# Patient Record
Sex: MALE | Race: WHITE | URBAN - METROPOLITAN AREA
[De-identification: names, ages, dates, MRNs, and addresses within clinical notes are randomized per-mention and may not be internally consistent; named-entity substitution may affect disease eponyms.]

---

## 2023-03-31 PROBLEM — F39 MOOD DISORDER (HCC): Status: ACTIVE | Noted: 2023-03-31

## 2023-12-12 ENCOUNTER — OFFICE VISIT (OUTPATIENT)
Age: 28
End: 2023-12-12
Payer: COMMERCIAL

## 2023-12-12 VITALS
RESPIRATION RATE: 16 BRPM | DIASTOLIC BLOOD PRESSURE: 78 MMHG | TEMPERATURE: 98.9 F | HEIGHT: 64 IN | BODY MASS INDEX: 38.28 KG/M2 | OXYGEN SATURATION: 99 % | HEART RATE: 73 BPM | SYSTOLIC BLOOD PRESSURE: 122 MMHG

## 2023-12-12 DIAGNOSIS — E03.4 HYPOTHYROIDISM DUE TO ACQUIRED ATROPHY OF THYROID: ICD-10-CM

## 2023-12-12 DIAGNOSIS — R41.82 ACUTE ALTERATION IN MENTAL STATUS: ICD-10-CM

## 2023-12-12 DIAGNOSIS — R27.0 ATAXIA: ICD-10-CM

## 2023-12-12 DIAGNOSIS — H81.90 VERTIGINOUS SYNDROME: ICD-10-CM

## 2023-12-12 DIAGNOSIS — E55.9 VITAMIN D DEFICIENCY: ICD-10-CM

## 2023-12-12 DIAGNOSIS — I65.23 BILATERAL CAROTID ARTERY STENOSIS: ICD-10-CM

## 2023-12-12 DIAGNOSIS — R42 DIZZINESSES: ICD-10-CM

## 2023-12-12 DIAGNOSIS — H83.2X2 VESTIBULAR DISEQUILIBRIUM INVOLVING LEFT INNER EAR: ICD-10-CM

## 2023-12-12 DIAGNOSIS — F84.5 ASPERGER SYNDROME: Primary | ICD-10-CM

## 2023-12-12 DIAGNOSIS — Z51.81 THERAPEUTIC DRUG MONITORING: ICD-10-CM

## 2023-12-12 DIAGNOSIS — F39 MOOD DISORDER (HCC): ICD-10-CM

## 2023-12-12 DIAGNOSIS — E53.8 B12 DEFICIENCY: ICD-10-CM

## 2023-12-12 DIAGNOSIS — R56.9 CONVULSIONS, UNSPECIFIED CONVULSION TYPE (HCC): ICD-10-CM

## 2023-12-12 PROCEDURE — 99205 OFFICE O/P NEW HI 60 MIN: CPT | Performed by: PSYCHIATRY & NEUROLOGY

## 2023-12-12 PROCEDURE — G8427 DOCREV CUR MEDS BY ELIG CLIN: HCPCS | Performed by: PSYCHIATRY & NEUROLOGY

## 2023-12-12 PROCEDURE — G8417 CALC BMI ABV UP PARAM F/U: HCPCS | Performed by: PSYCHIATRY & NEUROLOGY

## 2023-12-12 PROCEDURE — G8484 FLU IMMUNIZE NO ADMIN: HCPCS | Performed by: PSYCHIATRY & NEUROLOGY

## 2023-12-12 RX ORDER — NORTRIPTYLINE HYDROCHLORIDE 25 MG/1
CAPSULE ORAL
COMMUNITY
Start: 2023-12-07 | End: 2023-12-12 | Stop reason: CLARIF

## 2023-12-12 RX ORDER — NORTRIPTYLINE HYDROCHLORIDE 25 MG/1
25 CAPSULE ORAL NIGHTLY
Qty: 30 CAPSULE | Refills: 3
Start: 2023-12-12

## 2023-12-12 RX ORDER — ACETAMINOPHEN 160 MG
TABLET,DISINTEGRATING ORAL
COMMUNITY

## 2023-12-12 RX ORDER — VITAMIN B COMPLEX
1 CAPSULE ORAL DAILY
COMMUNITY

## 2023-12-12 ASSESSMENT — PATIENT HEALTH QUESTIONNAIRE - PHQ9
SUM OF ALL RESPONSES TO PHQ QUESTIONS 1-9: 2
2. FEELING DOWN, DEPRESSED OR HOPELESS: 1
1. LITTLE INTEREST OR PLEASURE IN DOING THINGS: 1
SUM OF ALL RESPONSES TO PHQ9 QUESTIONS 1 & 2: 2
SUM OF ALL RESPONSES TO PHQ QUESTIONS 1-9: 2

## 2023-12-13 ENCOUNTER — CLINICAL DOCUMENTATION (OUTPATIENT)
Age: 28
End: 2023-12-13

## 2023-12-14 ENCOUNTER — CLINICAL DOCUMENTATION (OUTPATIENT)
Age: 28
End: 2023-12-14

## 2023-12-14 NOTE — PROGRESS NOTES
Faxed attending physician statement completed and signed by provider to Vanessa/Lana at [phone] as provided on document. Tried to fax 3 times but receive \"no response\" each time. Called [employer] at [phone] and representative confirmed that we have the right fax number and if unable to successfully fax document, I can provide the patient with a copy of the document and they are able to upload it through the portal. Will call patient to inform them of this.

## 2023-12-15 LAB
ALBUMIN SERPL-MCNC: 4.5 G/DL (ref 3.5–5)
ALBUMIN/GLOB SERPL: 1.5 (ref 1.1–2.2)
ALP SERPL-CCNC: 64 U/L (ref 45–117)
ALT SERPL-CCNC: 37 U/L (ref 12–78)
ANION GAP SERPL CALC-SCNC: 5 MMOL/L (ref 5–15)
AST SERPL-CCNC: 18 U/L (ref 15–37)
BASOPHILS # BLD: 0 K/UL (ref 0–0.1)
BASOPHILS NFR BLD: 1 % (ref 0–1)
BILIRUB SERPL-MCNC: 0.5 MG/DL (ref 0.2–1)
BUN SERPL-MCNC: 6 MG/DL (ref 6–20)
BUN/CREAT SERPL: 8 (ref 12–20)
CALCIUM SERPL-MCNC: 9.1 MG/DL (ref 8.5–10.1)
CHLORIDE SERPL-SCNC: 106 MMOL/L (ref 97–108)
CO2 SERPL-SCNC: 29 MMOL/L (ref 21–32)
COMMENT:: NORMAL
CREAT SERPL-MCNC: 0.8 MG/DL (ref 0.7–1.3)
DIFFERENTIAL METHOD BLD: NORMAL
EOSINOPHIL # BLD: 0.1 K/UL (ref 0–0.4)
EOSINOPHIL NFR BLD: 1 % (ref 0–7)
ERYTHROCYTE [DISTWIDTH] IN BLOOD BY AUTOMATED COUNT: 12.9 % (ref 11.5–14.5)
ERYTHROCYTE [SEDIMENTATION RATE] IN BLOOD: 4 MM/HR (ref 0–15)
FOLATE SERPL-MCNC: 16.7 NG/ML (ref 5–21)
GLOBULIN SER CALC-MCNC: 3 G/DL (ref 2–4)
GLUCOSE SERPL-MCNC: 88 MG/DL (ref 65–100)
HCT VFR BLD AUTO: 44.8 % (ref 36.6–50.3)
HGB BLD-MCNC: 14.9 G/DL (ref 12.1–17)
IMM GRANULOCYTES # BLD AUTO: 0 K/UL (ref 0–0.04)
IMM GRANULOCYTES NFR BLD AUTO: 0 % (ref 0–0.5)
LYMPHOCYTES # BLD: 2.5 K/UL (ref 0.8–3.5)
LYMPHOCYTES NFR BLD: 29 % (ref 12–49)
MCH RBC QN AUTO: 29.1 PG (ref 26–34)
MCHC RBC AUTO-ENTMCNC: 33.3 G/DL (ref 30–36.5)
MCV RBC AUTO: 87.5 FL (ref 80–99)
MONOCYTES # BLD: 0.6 K/UL (ref 0–1)
MONOCYTES NFR BLD: 7 % (ref 5–13)
NEUTS SEG # BLD: 5.3 K/UL (ref 1.8–8)
NEUTS SEG NFR BLD: 62 % (ref 32–75)
NRBC # BLD: 0 K/UL (ref 0–0.01)
NRBC BLD-RTO: 0 PER 100 WBC
PLATELET # BLD AUTO: 377 K/UL (ref 150–400)
PMV BLD AUTO: 10.2 FL (ref 8.9–12.9)
POTASSIUM SERPL-SCNC: 4.3 MMOL/L (ref 3.5–5.1)
PROT SERPL-MCNC: 7.5 G/DL (ref 6.4–8.2)
RBC # BLD AUTO: 5.12 M/UL (ref 4.1–5.7)
SODIUM SERPL-SCNC: 140 MMOL/L (ref 136–145)
SPECIMEN HOLD: NORMAL
TSH SERPL DL<=0.05 MIU/L-ACNC: 0.84 UIU/ML (ref 0.36–3.74)
VIT B12 SERPL-MCNC: 519 PG/ML (ref 193–986)
WBC # BLD AUTO: 8.5 K/UL (ref 4.1–11.1)

## 2023-12-26 DIAGNOSIS — H83.2X2 VESTIBULAR DISEQUILIBRIUM INVOLVING LEFT INNER EAR: Primary | ICD-10-CM

## 2023-12-26 DIAGNOSIS — E55.9 VITAMIN D DEFICIENCY: ICD-10-CM

## 2023-12-26 DIAGNOSIS — E53.8 B12 DEFICIENCY: ICD-10-CM

## 2023-12-26 DIAGNOSIS — E03.4 HYPOTHYROIDISM DUE TO ACQUIRED ATROPHY OF THYROID: ICD-10-CM

## 2023-12-26 DIAGNOSIS — Z51.81 THERAPEUTIC DRUG MONITORING: ICD-10-CM

## 2023-12-27 ENCOUNTER — CLINICAL DOCUMENTATION (OUTPATIENT)
Age: 28
End: 2023-12-27

## 2023-12-27 NOTE — PROGRESS NOTES
Received request to tele-work addendum for disability paperwork from [employer]. Will give to provider to complete and sign and notify patient once complete.

## 2023-12-28 LAB
INTERPRETATION: NORMAL
METHOD: NORMAL
NEURONAL ANTIBODIES: 5 UNITS (ref 0–54)

## 2024-01-07 ENCOUNTER — HOSPITAL ENCOUNTER (OUTPATIENT)
Facility: HOSPITAL | Age: 29
Discharge: HOME | End: 2024-01-10
Attending: PSYCHIATRY & NEUROLOGY
Payer: COMMERCIAL

## 2024-01-07 DIAGNOSIS — R41.82 ACUTE ALTERATION IN MENTAL STATUS: ICD-10-CM

## 2024-01-07 DIAGNOSIS — I65.23 BILATERAL CAROTID ARTERY STENOSIS: ICD-10-CM

## 2024-01-07 DIAGNOSIS — H81.90 VERTIGINOUS SYNDROME: ICD-10-CM

## 2024-01-07 DIAGNOSIS — R42 DIZZINESSES: ICD-10-CM

## 2024-01-07 DIAGNOSIS — H83.2X2 VESTIBULAR DISEQUILIBRIUM INVOLVING LEFT INNER EAR: Primary | ICD-10-CM

## 2024-01-07 DIAGNOSIS — Z51.81 THERAPEUTIC DRUG MONITORING: ICD-10-CM

## 2024-01-07 DIAGNOSIS — H83.2X2 VESTIBULAR DISEQUILIBRIUM INVOLVING LEFT INNER EAR: ICD-10-CM

## 2024-01-07 DIAGNOSIS — R56.9 CONVULSIONS, UNSPECIFIED CONVULSION TYPE (HCC): ICD-10-CM

## 2024-01-07 PROCEDURE — 6360000004 HC RX CONTRAST MEDICATION: Performed by: PSYCHIATRY & NEUROLOGY

## 2024-01-07 PROCEDURE — A9579 GAD-BASE MR CONTRAST NOS,1ML: HCPCS | Performed by: PSYCHIATRY & NEUROLOGY

## 2024-01-07 PROCEDURE — 70553 MRI BRAIN STEM W/O & W/DYE: CPT

## 2024-01-07 RX ADMIN — GADOTERIDOL 20 ML: 279.3 INJECTION, SOLUTION INTRAVENOUS at 08:47

## 2024-01-10 ENCOUNTER — TELEPHONE (OUTPATIENT)
Age: 29
End: 2024-01-10

## 2024-01-10 NOTE — TELEPHONE ENCOUNTER
Called patient and verified with 2 identifiers. Informed him that telehealth addendum to disability paperwork has been completed and he can come into the office to pick it up. Made copy and gave to  for fast scan. Paperwork left at  in envelope with patient's name.

## 2024-01-22 ENCOUNTER — TELEPHONE (OUTPATIENT)
Age: 29
End: 2024-01-22

## 2024-01-22 NOTE — TELEPHONE ENCOUNTER
Vanessa called stating that they are missing the Healthcare Provider form regding pt accommendation. Please fax to [phone] office [phone] ext [phone]

## 2024-01-22 NOTE — TELEPHONE ENCOUNTER
Called patient and verified with 2 identifiers. Confirmed that patient had picked up original tele-work document for disability but stated there was more information to be filled out. 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 Patient verbalized the understanding and expressed the urgency.

## 2024-01-22 NOTE — TELEPHONE ENCOUNTER
Faxed the rest of the completed paperwork to [facility] at [phone]. Waiting for fax confirmation and will notify patient of completion.     Received \"no response\" after fax. Called Jess and spoke with Reginald who stated I had the correct fax number. Informed I was unable to fax and to make note that I tried so patient would not get in trouble with his employer. Reginald verbalized understanding and stated that this was possible and she would make note.     Called patient and informed him of call with jess and that I would leave his forms up front at the  with his and his wife's Grace's name on it. He thanked us for doing this and addressing the urgency and had no other questions or requests at this time.

## 2024-01-22 NOTE — TELEPHONE ENCOUNTER
Patient states he needs to get back to work tomorrow and this needs to be done or it  could cost him his job.     He stated he gave it to us in December and he hasn't got it back from us until January 17th, and when we did give it back, it was not completed.     He is asking for us to do ASAP within 24 hours or he will come to the office and speak with administrative to have a discussion as he is displeased. Thank you.

## 2024-01-22 NOTE — TELEPHONE ENCOUNTER
Patient called back asking us to give him a call when this is done. He is worried about losing his job for something that he states is out of his control. Thank you!

## 2024-01-23 NOTE — TELEPHONE ENCOUNTER
Called patient and he answered the phone stating \"is this the same person that hung up on me?!\" I went on stating this was Shauna from  [facility] regarding his disability paperwork. Patient then stated aggressively that he did not receive his paperwork in its entirety. I stated I gave him all of the paperwork that we had and he insisted that we did not complete everything. I stated everything that we have would be scanned into the chart and read off what we had at this time. He stated that he is missing the cognitive and physical disability portion and said our office was not taking accountability and hung up.     Called [facility] to inquire which documents were missing and LVM.     Patient came into the office. Notified practice manager who called security for workplace violence precautions to stand by. JACKIE Maldonado came back to nurses station with required paperwork provided from patient. Dr. Montague filled out paperwork. Gave paperwork to patient to look over to ensure it was all there and complete. Patient stated it was good. Made a copy to scan into the chart. Deepika Jenkins, practice manager spoke with patient and provided the number for patient advocacy. Patient had no other questions and did not need anything else at this time. Patient apologized to me for the way he behaved on the phone.    Attending Only

## 2024-01-23 NOTE — TELEPHONE ENCOUNTER
Patient called back and was very upset with me that his paperwork was not complete.     I informed him that I sent through the message to the office yesterday and he continued having a nasty attitude towards me. He stated his wife came to pickup paperwork from the office yesterday but it was not everything he was needing.     He said two forms were missing and this will potentially cost him his job. Please give him a call to get this done. Thank you!

## 2024-01-29 ENCOUNTER — TELEPHONE (OUTPATIENT)
Age: 29
End: 2024-01-29

## 2024-01-29 NOTE — TELEPHONE ENCOUNTER
Called patient and notified him that first page of disability form was updated and signed and left at the  with his name on it to . Informed him that it was the first page that was updated and all other forms that were given to me on Friday 01/26/2024 were included back in the envelope. Patient verbalized understanding.

## 2024-01-29 NOTE — TELEPHONE ENCOUNTER
Sent email to Mimi Chandra,   [email or web address],  [employer] and   to check on status of PA for CTA Head/Neck     Her contact information:    [phone] mobile or [phone] work    Order written 1/7/24    C: Shauna Shah RN in office

## 2024-01-30 ENCOUNTER — HOSPITAL ENCOUNTER (OUTPATIENT)
Facility: HOSPITAL | Age: 29
Discharge: HOME | End: 2024-02-02
Payer: COMMERCIAL

## 2024-01-30 DIAGNOSIS — H83.2X2 VESTIBULAR DISEQUILIBRIUM INVOLVING LEFT INNER EAR: ICD-10-CM

## 2024-01-30 DIAGNOSIS — H81.90 VERTIGINOUS SYNDROME: ICD-10-CM

## 2024-01-30 DIAGNOSIS — R41.82 ACUTE ALTERATION IN MENTAL STATUS: ICD-10-CM

## 2024-01-30 DIAGNOSIS — Z51.81 THERAPEUTIC DRUG MONITORING: ICD-10-CM

## 2024-01-30 DIAGNOSIS — R42 DIZZINESSES: ICD-10-CM

## 2024-01-30 DIAGNOSIS — R56.9 CONVULSIONS, UNSPECIFIED CONVULSION TYPE (HCC): ICD-10-CM

## 2024-01-30 PROCEDURE — 95708 EEG WO VID EA 12-26HR UNMNTR: CPT

## 2024-01-31 PROBLEM — R56.9 CONVULSIONS (HCC): Status: ACTIVE | Noted: 2024-01-31

## 2024-01-31 PROBLEM — R41.82 ACUTE ALTERATION IN MENTAL STATUS: Status: ACTIVE | Noted: 2024-01-31

## 2024-02-01 NOTE — PROCEDURES
correlation.    INTERPRETATION:  This is an essentially normal ambulatory 24-hour EEG recording that was somewhat technically poor but interpretable particularly through the first 13 hours and some even after that due to muscle electrode and movement artifact in the central head regions.  There were no clear areas of focal slowing.  No clear spike or spike-and-wave discharges.  No recorded electrographic or dysrhythmic spells of any type seen.  Clinical correlation recommended.    The patient at this time had not returned the clinical diary for clinical correlation.      ROWDY MONTAGUE MD      [id number]  D:  01/31/2024 21:21  T:  01/31/2024 22:16  JOB #:  [id number]  CC:  Rowdy Montague MD

## 2024-02-02 ENCOUNTER — CLINICAL DOCUMENTATION (OUTPATIENT)
Age: 29
End: 2024-02-02

## 2024-02-12 ENCOUNTER — TELEPHONE (OUTPATIENT)
Age: 29
End: 2024-02-12

## 2024-02-12 NOTE — TELEPHONE ENCOUNTER
I just tried to call him back with the patient advocacy number for medical office buildings and the number rang busy. If he calls back, the number is: [phone]

## 2024-02-12 NOTE — TELEPHONE ENCOUNTER
Patient called to ask for EEG results from two weeks ago. He was frustrated that he had not gotten his results back yet. He asked for the patient advocacy line for our office.    He states he was told by someone that [facility] patient advocacy line is different from the medical office advocacy line. I told him I would call him back with that number.     Please give him a call to discuss the EEG results when you can. Thank you!

## 2024-02-13 ENCOUNTER — CLINICAL DOCUMENTATION (OUTPATIENT)
Age: 29
End: 2024-02-13

## 2024-02-13 NOTE — PROGRESS NOTES
I called the patient, he advised him that his EEG was normal, and there was no EEG correlate with all his episodes of dizziness.  There was no abnormality seen in all.  I asked him why he has not had the CTA he said he was going to go to another medical office, and I told him if we could help in the future let us know, and we will follow as needed for now.

## 2024-05-15 ENCOUNTER — TELEPHONE (OUTPATIENT)
Age: 29
End: 2024-05-15

## 2024-05-15 NOTE — TELEPHONE ENCOUNTER
Ivan/Vanessa states that they have recvd the FMLA forms dated 5/10; however it has 2 different pen colors on the form. Ivan would like a call back to confirm that Dr. Montague did fill out the forms. He will be faxing them back over to the office. Please call Vanessa at [phone] ext [phone]

## 2024-05-16 ENCOUNTER — CLINICAL DOCUMENTATION (OUTPATIENT)
Age: 29
End: 2024-05-16

## 2024-05-16 NOTE — PROGRESS NOTES
Faxed Authentication form to [employer] confirmed complete     Filed to be scanned into media

## 2024-05-16 NOTE — TELEPHONE ENCOUNTER
Called Ivan/Vanessa related to FMLA forms dated 5/10 for patient. Writer informed him that   we have not filled out any forms for the patient recently, and that we have not seen the patient in some time, and he went to get his care elsewhere.   Ivan stated he faxed over a form for us to fill out stating Dr. Montague didn't sign the [place] paperwork today. I will look for the paperwork , and give to Dr. Montague when located.